# Patient Record
Sex: MALE | Race: WHITE | NOT HISPANIC OR LATINO | Employment: FULL TIME | URBAN - METROPOLITAN AREA
[De-identification: names, ages, dates, MRNs, and addresses within clinical notes are randomized per-mention and may not be internally consistent; named-entity substitution may affect disease eponyms.]

---
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
## 2024-12-12 PROBLEM — Z79.899 TRANSGENDER PERSON ON HORMONE THERAPY: Status: ACTIVE | Noted: 2024-12-12

## 2024-12-12 PROBLEM — F64.0 TRANSGENDER PERSON ON HORMONE THERAPY: Status: ACTIVE | Noted: 2024-12-12

## 2025-05-29 ENCOUNTER — OFFICE VISIT (OUTPATIENT)
Dept: ENDOCRINOLOGY | Facility: CLINIC | Age: 25
End: 2025-05-29
Attending: INTERNAL MEDICINE
Payer: COMMERCIAL

## 2025-05-29 DIAGNOSIS — F64.0 TRANSGENDER PERSON ON HORMONE THERAPY: Primary | ICD-10-CM

## 2025-05-29 DIAGNOSIS — Z79.899 TRANSGENDER PERSON ON HORMONE THERAPY: Primary | ICD-10-CM

## 2025-05-29 PROCEDURE — 1160F RVW MEDS BY RX/DR IN RCRD: CPT | Mod: CPTII,95,, | Performed by: INTERNAL MEDICINE

## 2025-05-29 PROCEDURE — 1159F MED LIST DOCD IN RCRD: CPT | Mod: CPTII,95,, | Performed by: INTERNAL MEDICINE

## 2025-05-29 PROCEDURE — 98002 SYNCH AUDIO-VIDEO NEW MOD 45: CPT | Mod: 95,,, | Performed by: INTERNAL MEDICINE

## 2025-05-29 NOTE — PROGRESS NOTES
"Subjective:      Patient ID: Heena Yusuf is a 25 y.o.    Chief Complaint:  gender      History of Present Illness  With regards to their  gender incongruence care:    They made an appointment with me to get a referral to a surgeon. They do not wish to transfer their hormonal care to me.      Gender identity - female     Pronouns: she/her       Name legally changed?   Gender marker legally changed? Yes - [place]      Therapist at the time hormones were started: yes        Gender Surgeries - none, but wants GRS   Was approve for BA but opted not to go forward     Fertility concerns - not  interested     started cross hormone therapy age 22    0.3 ml week estradiol natty 20 mg  (has pill just in case)   Aldactone 50 mg qd       Goals of therapy:  Breast tissue, body fat redistribution, have more feminine appearance, decrease body and facial hair     Social:  Work -- cares for disabled people  Family -   supportive     Relationship, orientation -     single   Sexual activity with men and women        Housing - lives with  family      Has been dx with  Anxiety and depression  - has a MHP           ROS:   As above    Objective:     There were no vitals taken for this visit.    There is no height or weight on file to calculate BMI.      Physical Exam           Lab Review:   No results found for: "HGBA1C"  Lab Results   Component Value Date    CHOL 208 (H) 12/13/2024    HDL 51 12/13/2024    LDLCALC 116.2 12/13/2024    TRIG 204 (H) 12/13/2024    CHOLHDL 24.5 12/13/2024     Lab Results   Component Value Date     10/21/2024    K 3.8 10/21/2024     10/21/2024    CO2 22 10/21/2024    GLU 96 10/21/2024    BUN 10 10/21/2024    CREATININE 0.81 10/21/2024    CALCIUM 9.5 10/21/2024    PROT 7.6 10/21/2024    ALBUMIN 4.4 10/21/2024    BILITOT 0.4 10/21/2024    ALKPHOS 57 10/21/2024    AST 30 10/21/2024    ALT 26 10/21/2024    ANIONGAP 12 10/21/2024    ESTGFRAFRICA SEE COMMENT 11/01/2018    EGFRNONAA SEE COMMENT " "11/01/2018    TSH 1.316 11/01/2018     No results found for: "QXNKIEKK62BU"  Lab Results   Component Value Date    WBC 10.01 10/21/2024    HGB 12.8 (L) 10/21/2024    HCT 38.1 (L) 10/21/2024    MCV 90 10/21/2024     10/21/2024           Assessment and Plan     Transgender person on hormone therapy  I provided names of surgeons in [place] that do gender affirming surgeries. I also give them the information on [facility]. She should reach out to her insurance to see if any of these providers are preferred. Once she finds a provider, I'm happy to put in a referral as well as provide a letter of support.      The patient location is: [place]  The chief complaint leading to consultation is: above     Visit type: audiovisual    Level of service is based on medical decision-making and not time      Each patient to whom he or she provides medical services by telemedicine is:  (1) informed of the relationship between the physician and patient and the respective role of any other health care provider with respect to management of the patient; and (2) notified that he or she may decline to receive medical services by telemedicine and may withdraw from such care at any time.        "

## 2025-05-29 NOTE — PATIENT INSTRUCTIONS
Thank you for completing a virtual visit with me!     Per our conversation, there are several plastic surgeons locally that have experience with gender patients : Dr Jared Wheeler, Dr Jose R Mata, Dr. Cade Sears, Maria Guadalupe Romero, Gavin Gimenez, and Julián Meadows.     I am not sure which of these surgeons are doing vaginoplasty, so you would need to reach out to them.  If you opt to use a gender surgical center, I would recommend [facility].    Once you find a surgeon, send me their name, and I will send a referral. Also, I'm happy to write a letter of support on your behalf.     Please let me know if you have any other questions.    Thank you,  Gisele Fraga MD            
(4) no impairment

## 2025-05-29 NOTE — ASSESSMENT & PLAN NOTE
I provided names of surgeons in [place] that do gender affirming surgeries. I also give them the information on [facility]. She should reach out to her insurance to see if any of these providers are preferred. Once she finds a provider, I'm happy to put in a referral as well as provide a letter of support.